# Patient Record
Sex: MALE | Race: WHITE | Employment: OTHER | URBAN - METROPOLITAN AREA
[De-identification: names, ages, dates, MRNs, and addresses within clinical notes are randomized per-mention and may not be internally consistent; named-entity substitution may affect disease eponyms.]

---

## 2023-07-08 ENCOUNTER — HOSPITAL ENCOUNTER (EMERGENCY)
Age: 68
Discharge: HOME | DRG: 310 | End: 2023-07-08
Attending: EMERGENCY MEDICINE
Payer: MEDICARE

## 2023-07-08 ENCOUNTER — APPOINTMENT (OUTPATIENT)
Dept: GENERAL RADIOLOGY | Age: 68
DRG: 310 | End: 2023-07-08
Payer: MEDICARE

## 2023-07-08 ENCOUNTER — HOSPITAL ENCOUNTER (INPATIENT)
Age: 68
LOS: 1 days | Discharge: HOME | DRG: 310 | End: 2023-07-10
Attending: EMERGENCY MEDICINE | Admitting: STUDENT IN AN ORGANIZED HEALTH CARE EDUCATION/TRAINING PROGRAM
Payer: MEDICARE

## 2023-07-08 VITALS
DIASTOLIC BLOOD PRESSURE: 82 MMHG | SYSTOLIC BLOOD PRESSURE: 181 MMHG | OXYGEN SATURATION: 99 % | RESPIRATION RATE: 15 BRPM | WEIGHT: 191 LBS | TEMPERATURE: 98.2 F | HEART RATE: 53 BPM

## 2023-07-08 DIAGNOSIS — I47.20 VENTRICULAR TACHYARRHYTHMIA (HCC): ICD-10-CM

## 2023-07-08 DIAGNOSIS — I48.0 INTERMITTENT ATRIAL FIBRILLATION (HCC): Primary | ICD-10-CM

## 2023-07-08 PROBLEM — I25.10 CAD (CORONARY ARTERY DISEASE): Chronic | Status: ACTIVE | Noted: 2023-07-08

## 2023-07-08 LAB
ALBUMIN SERPL-MCNC: 3.9 G/DL (ref 3.4–5)
ALBUMIN/GLOB SERPL: 1.1 {RATIO} (ref 1.1–2.2)
ALP SERPL-CCNC: 69 U/L (ref 40–129)
ALT SERPL-CCNC: 18 U/L (ref 10–40)
ANION GAP SERPL CALCULATED.3IONS-SCNC: 12 MMOL/L (ref 3–16)
ANION GAP SERPL CALCULATED.3IONS-SCNC: 9 MMOL/L (ref 3–16)
AST SERPL-CCNC: 19 U/L (ref 15–37)
BASOPHILS # BLD: 0 K/UL (ref 0–0.2)
BASOPHILS # BLD: 0 K/UL (ref 0–0.2)
BASOPHILS NFR BLD: 0.5 %
BASOPHILS NFR BLD: 0.6 %
BILIRUB SERPL-MCNC: 0.7 MG/DL (ref 0–1)
BUN SERPL-MCNC: 15 MG/DL (ref 7–20)
BUN SERPL-MCNC: 18 MG/DL (ref 7–20)
CALCIUM SERPL-MCNC: 9.4 MG/DL (ref 8.3–10.6)
CALCIUM SERPL-MCNC: 9.4 MG/DL (ref 8.3–10.6)
CHLORIDE SERPL-SCNC: 106 MMOL/L (ref 99–110)
CHLORIDE SERPL-SCNC: 106 MMOL/L (ref 99–110)
CO2 SERPL-SCNC: 23 MMOL/L (ref 21–32)
CO2 SERPL-SCNC: 25 MMOL/L (ref 21–32)
CREAT SERPL-MCNC: 0.9 MG/DL (ref 0.8–1.3)
CREAT SERPL-MCNC: 1 MG/DL (ref 0.8–1.3)
DEPRECATED RDW RBC AUTO: 15.5 % (ref 12.4–15.4)
DEPRECATED RDW RBC AUTO: 15.5 % (ref 12.4–15.4)
EOSINOPHIL # BLD: 0.2 K/UL (ref 0–0.6)
EOSINOPHIL # BLD: 0.2 K/UL (ref 0–0.6)
EOSINOPHIL NFR BLD: 2.2 %
EOSINOPHIL NFR BLD: 2.7 %
GFR SERPLBLD CREATININE-BSD FMLA CKD-EPI: >60 ML/MIN/{1.73_M2}
GFR SERPLBLD CREATININE-BSD FMLA CKD-EPI: >60 ML/MIN/{1.73_M2}
GLUCOSE SERPL-MCNC: 139 MG/DL (ref 70–99)
GLUCOSE SERPL-MCNC: 159 MG/DL (ref 70–99)
HCT VFR BLD AUTO: 40.7 % (ref 40.5–52.5)
HCT VFR BLD AUTO: 41.1 % (ref 40.5–52.5)
HGB BLD-MCNC: 13.7 G/DL (ref 13.5–17.5)
HGB BLD-MCNC: 13.8 G/DL (ref 13.5–17.5)
LYMPHOCYTES # BLD: 2.8 K/UL (ref 1–5.1)
LYMPHOCYTES # BLD: 3 K/UL (ref 1–5.1)
LYMPHOCYTES NFR BLD: 34 %
LYMPHOCYTES NFR BLD: 34.9 %
MAGNESIUM SERPL-MCNC: 1.8 MG/DL (ref 1.8–2.4)
MCH RBC QN AUTO: 28.3 PG (ref 26–34)
MCH RBC QN AUTO: 28.4 PG (ref 26–34)
MCHC RBC AUTO-ENTMCNC: 33.4 G/DL (ref 31–36)
MCHC RBC AUTO-ENTMCNC: 33.8 G/DL (ref 31–36)
MCV RBC AUTO: 83.8 FL (ref 80–100)
MCV RBC AUTO: 84.8 FL (ref 80–100)
MONOCYTES # BLD: 0.8 K/UL (ref 0–1.3)
MONOCYTES # BLD: 0.9 K/UL (ref 0–1.3)
MONOCYTES NFR BLD: 10.1 %
MONOCYTES NFR BLD: 10.3 %
NEUTROPHILS # BLD: 4.2 K/UL (ref 1.7–7.7)
NEUTROPHILS # BLD: 4.8 K/UL (ref 1.7–7.7)
NEUTROPHILS NFR BLD: 51.5 %
NEUTROPHILS NFR BLD: 53.2 %
PHOSPHATE SERPL-MCNC: 3.5 MG/DL (ref 2.5–4.9)
PLATELET # BLD AUTO: 247 K/UL (ref 135–450)
PLATELET # BLD AUTO: 259 K/UL (ref 135–450)
PMV BLD AUTO: 8.8 FL (ref 5–10.5)
PMV BLD AUTO: 9 FL (ref 5–10.5)
POTASSIUM SERPL-SCNC: 3.7 MMOL/L (ref 3.5–5.1)
POTASSIUM SERPL-SCNC: 4 MMOL/L (ref 3.5–5.1)
PROT SERPL-MCNC: 7.4 G/DL (ref 6.4–8.2)
RBC # BLD AUTO: 4.84 M/UL (ref 4.2–5.9)
RBC # BLD AUTO: 4.86 M/UL (ref 4.2–5.9)
SODIUM SERPL-SCNC: 140 MMOL/L (ref 136–145)
SODIUM SERPL-SCNC: 141 MMOL/L (ref 136–145)
TROPONIN, HIGH SENSITIVITY: 20 NG/L (ref 0–22)
TROPONIN, HIGH SENSITIVITY: 21 NG/L (ref 0–22)
WBC # BLD AUTO: 8.1 K/UL (ref 4–11)
WBC # BLD AUTO: 9 K/UL (ref 4–11)

## 2023-07-08 PROCEDURE — 84100 ASSAY OF PHOSPHORUS: CPT

## 2023-07-08 PROCEDURE — 93005 ELECTROCARDIOGRAM TRACING: CPT | Performed by: EMERGENCY MEDICINE

## 2023-07-08 PROCEDURE — 80048 BASIC METABOLIC PNL TOTAL CA: CPT

## 2023-07-08 PROCEDURE — 85025 COMPLETE CBC W/AUTO DIFF WBC: CPT

## 2023-07-08 PROCEDURE — 99284 EMERGENCY DEPT VISIT MOD MDM: CPT

## 2023-07-08 PROCEDURE — 96375 TX/PRO/DX INJ NEW DRUG ADDON: CPT

## 2023-07-08 PROCEDURE — G0378 HOSPITAL OBSERVATION PER HR: HCPCS

## 2023-07-08 PROCEDURE — 2580000003 HC RX 258: Performed by: STUDENT IN AN ORGANIZED HEALTH CARE EDUCATION/TRAINING PROGRAM

## 2023-07-08 PROCEDURE — 96374 THER/PROPH/DIAG INJ IV PUSH: CPT

## 2023-07-08 PROCEDURE — 2500000003 HC RX 250 WO HCPCS: Performed by: EMERGENCY MEDICINE

## 2023-07-08 PROCEDURE — 83735 ASSAY OF MAGNESIUM: CPT

## 2023-07-08 PROCEDURE — 6360000002 HC RX W HCPCS: Performed by: STUDENT IN AN ORGANIZED HEALTH CARE EDUCATION/TRAINING PROGRAM

## 2023-07-08 PROCEDURE — 71046 X-RAY EXAM CHEST 2 VIEWS: CPT

## 2023-07-08 PROCEDURE — 99285 EMERGENCY DEPT VISIT HI MDM: CPT

## 2023-07-08 PROCEDURE — 6370000000 HC RX 637 (ALT 250 FOR IP): Performed by: STUDENT IN AN ORGANIZED HEALTH CARE EDUCATION/TRAINING PROGRAM

## 2023-07-08 PROCEDURE — 36415 COLL VENOUS BLD VENIPUNCTURE: CPT

## 2023-07-08 PROCEDURE — 84484 ASSAY OF TROPONIN QUANT: CPT

## 2023-07-08 PROCEDURE — 96365 THER/PROPH/DIAG IV INF INIT: CPT

## 2023-07-08 PROCEDURE — 80053 COMPREHEN METABOLIC PANEL: CPT

## 2023-07-08 PROCEDURE — 6370000000 HC RX 637 (ALT 250 FOR IP): Performed by: EMERGENCY MEDICINE

## 2023-07-08 RX ORDER — SODIUM CHLORIDE 0.9 % (FLUSH) 0.9 %
5-40 SYRINGE (ML) INJECTION PRN
Status: DISCONTINUED | OUTPATIENT
Start: 2023-07-08 | End: 2023-07-10 | Stop reason: HOSPADM

## 2023-07-08 RX ORDER — ATORVASTATIN CALCIUM 40 MG/1
40 TABLET, FILM COATED ORAL DAILY
Status: DISCONTINUED | OUTPATIENT
Start: 2023-07-09 | End: 2023-07-10 | Stop reason: HOSPADM

## 2023-07-08 RX ORDER — AMIODARONE HYDROCHLORIDE 200 MG/1
200 TABLET ORAL DAILY
Status: ON HOLD | COMMUNITY
End: 2023-07-10 | Stop reason: HOSPADM

## 2023-07-08 RX ORDER — POLYETHYLENE GLYCOL 3350 17 G/17G
17 POWDER, FOR SOLUTION ORAL DAILY PRN
Status: DISCONTINUED | OUTPATIENT
Start: 2023-07-08 | End: 2023-07-10 | Stop reason: HOSPADM

## 2023-07-08 RX ORDER — POTASSIUM CHLORIDE 750 MG/1
20 TABLET, FILM COATED, EXTENDED RELEASE ORAL ONCE
Status: COMPLETED | OUTPATIENT
Start: 2023-07-08 | End: 2023-07-08

## 2023-07-08 RX ORDER — MAGNESIUM SULFATE IN WATER 40 MG/ML
2000 INJECTION, SOLUTION INTRAVENOUS ONCE
Status: COMPLETED | OUTPATIENT
Start: 2023-07-08 | End: 2023-07-09

## 2023-07-08 RX ORDER — MULTIVITAMIN
1 TABLET ORAL DAILY
COMMUNITY

## 2023-07-08 RX ORDER — LISINOPRIL 5 MG/1
5 TABLET ORAL DAILY
COMMUNITY

## 2023-07-08 RX ORDER — ASPIRIN 81 MG/1
81 TABLET, CHEWABLE ORAL DAILY
COMMUNITY

## 2023-07-08 RX ORDER — LISINOPRIL 5 MG/1
5 TABLET ORAL DAILY
Status: DISCONTINUED | OUTPATIENT
Start: 2023-07-09 | End: 2023-07-09

## 2023-07-08 RX ORDER — GUAIFENESIN 600 MG/1
600 TABLET, EXTENDED RELEASE ORAL 2 TIMES DAILY
COMMUNITY

## 2023-07-08 RX ORDER — METOPROLOL TARTRATE 5 MG/5ML
5 INJECTION INTRAVENOUS ONCE
Status: COMPLETED | OUTPATIENT
Start: 2023-07-08 | End: 2023-07-08

## 2023-07-08 RX ORDER — SODIUM CHLORIDE 9 MG/ML
INJECTION, SOLUTION INTRAVENOUS PRN
Status: DISCONTINUED | OUTPATIENT
Start: 2023-07-08 | End: 2023-07-10 | Stop reason: HOSPADM

## 2023-07-08 RX ORDER — ACETAMINOPHEN 500 MG
1000 TABLET ORAL ONCE
Status: COMPLETED | OUTPATIENT
Start: 2023-07-08 | End: 2023-07-08

## 2023-07-08 RX ORDER — FEXOFENADINE HCL 180 MG/1
180 TABLET ORAL DAILY
COMMUNITY

## 2023-07-08 RX ORDER — ONDANSETRON 2 MG/ML
4 INJECTION INTRAMUSCULAR; INTRAVENOUS EVERY 6 HOURS PRN
Status: DISCONTINUED | OUTPATIENT
Start: 2023-07-08 | End: 2023-07-10 | Stop reason: HOSPADM

## 2023-07-08 RX ORDER — ACETAMINOPHEN 650 MG/1
650 SUPPOSITORY RECTAL EVERY 6 HOURS PRN
Status: DISCONTINUED | OUTPATIENT
Start: 2023-07-08 | End: 2023-07-08

## 2023-07-08 RX ORDER — ONDANSETRON 4 MG/1
4 TABLET, ORALLY DISINTEGRATING ORAL EVERY 8 HOURS PRN
Status: DISCONTINUED | OUTPATIENT
Start: 2023-07-08 | End: 2023-07-10 | Stop reason: HOSPADM

## 2023-07-08 RX ORDER — SODIUM CHLORIDE 0.9 % (FLUSH) 0.9 %
5-40 SYRINGE (ML) INJECTION EVERY 12 HOURS SCHEDULED
Status: DISCONTINUED | OUTPATIENT
Start: 2023-07-08 | End: 2023-07-10 | Stop reason: HOSPADM

## 2023-07-08 RX ORDER — DOCUSATE SODIUM 100 MG/1
100 CAPSULE, LIQUID FILLED ORAL 2 TIMES DAILY
COMMUNITY

## 2023-07-08 RX ORDER — AMIODARONE HYDROCHLORIDE 200 MG/1
200 TABLET ORAL DAILY
Status: DISCONTINUED | OUTPATIENT
Start: 2023-07-09 | End: 2023-07-09

## 2023-07-08 RX ORDER — ASPIRIN 81 MG/1
81 TABLET, CHEWABLE ORAL DAILY
Status: DISCONTINUED | OUTPATIENT
Start: 2023-07-09 | End: 2023-07-10 | Stop reason: HOSPADM

## 2023-07-08 RX ORDER — ACETAMINOPHEN 325 MG/1
650 TABLET ORAL EVERY 6 HOURS PRN
Status: DISCONTINUED | OUTPATIENT
Start: 2023-07-08 | End: 2023-07-10 | Stop reason: HOSPADM

## 2023-07-08 RX ADMIN — MAGNESIUM SULFATE HEPTAHYDRATE 2000 MG: 40 INJECTION, SOLUTION INTRAVENOUS at 23:07

## 2023-07-08 RX ADMIN — ACETAMINOPHEN 650 MG: 325 TABLET ORAL at 22:59

## 2023-07-08 RX ADMIN — SODIUM CHLORIDE: 9 INJECTION, SOLUTION INTRAVENOUS at 23:06

## 2023-07-08 RX ADMIN — Medication 10 ML: at 23:04

## 2023-07-08 RX ADMIN — APIXABAN 5 MG: 5 TABLET, FILM COATED ORAL at 23:01

## 2023-07-08 RX ADMIN — POTASSIUM CHLORIDE 20 MEQ: 750 TABLET, FILM COATED, EXTENDED RELEASE ORAL at 22:59

## 2023-07-08 RX ADMIN — METOPROLOL TARTRATE 5 MG: 5 INJECTION INTRAVENOUS at 10:41

## 2023-07-08 RX ADMIN — ACETAMINOPHEN 1000 MG: 500 TABLET ORAL at 10:41

## 2023-07-08 ASSESSMENT — PAIN SCALES - GENERAL
PAINLEVEL_OUTOF10: 0
PAINLEVEL_OUTOF10: 7
PAINLEVEL_OUTOF10: 3
PAINLEVEL_OUTOF10: 1

## 2023-07-08 ASSESSMENT — PAIN DESCRIPTION - DESCRIPTORS: DESCRIPTORS: DISCOMFORT

## 2023-07-08 ASSESSMENT — ENCOUNTER SYMPTOMS
VOMITING: 0
SHORTNESS OF BREATH: 0
NAUSEA: 0
ABDOMINAL PAIN: 0
COUGH: 0

## 2023-07-08 ASSESSMENT — PAIN DESCRIPTION - LOCATION
LOCATION: CHEST
LOCATION: GENERALIZED

## 2023-07-08 ASSESSMENT — LIFESTYLE VARIABLES: HOW OFTEN DO YOU HAVE A DRINK CONTAINING ALCOHOL: NEVER

## 2023-07-08 ASSESSMENT — PAIN - FUNCTIONAL ASSESSMENT
PAIN_FUNCTIONAL_ASSESSMENT: NONE - DENIES PAIN
PAIN_FUNCTIONAL_ASSESSMENT: NONE - DENIES PAIN
PAIN_FUNCTIONAL_ASSESSMENT: ACTIVITIES ARE NOT PREVENTED

## 2023-07-08 ASSESSMENT — PAIN DESCRIPTION - ORIENTATION: ORIENTATION: MID

## 2023-07-08 NOTE — DISCHARGE INSTRUCTIONS
It was recommended for you to stay in the hospital and consider being evaluated for an implantable defibrillator device; you declined this and instead elected to return home. Continue to wear your Life Vest as often as possible. Continue your usual home medications, especially your blood thinner, amiodarone, and metoprolol! Be sure to contact your primary heart doctor to arrange for a follow up visit. I woulc recommend calling their offices today. If you have any new or worsening issues after going home don't hesitate to return here for reevaluation at any time 24/7!

## 2023-07-08 NOTE — ED NOTES
Pt arrived to [facility] via private vehicle. Pt c/o his external Zoll beeping to alert him of a shock. Pt states that he was not shocked because he stopped it. Pt denies any chest pain, SOB, or dizziness, but reports a recent heart surgery/triple bypass and that he wears the external Zoll because he would not allow his heart surgeons to place an internal defibrillator because he didn't want to be shocked. Pt in afib upon arrival, states that he has been told in the past that he has been in afib. Pt states that he is in town for a family reunion, his cardiology team is in [place]. Pt awake, alert and oriented x 3. Skin warm and dry/normal color for ethnicity. Resp easy and unlabored. Pt placed in gown and on cardiac monitor. Call light in reach. Will continue to monitor.        Gabriela Blake RN  07/08/23 1841       Diamond Norton RN  07/08/23 2249

## 2023-07-09 PROBLEM — I47.29 NSVT (NONSUSTAINED VENTRICULAR TACHYCARDIA) (HCC): Status: ACTIVE | Noted: 2023-07-09

## 2023-07-09 PROBLEM — I10 PRIMARY HYPERTENSION: Status: ACTIVE | Noted: 2023-07-09

## 2023-07-09 PROBLEM — I25.5 ISCHEMIC CARDIOMYOPATHY: Status: ACTIVE | Noted: 2023-07-09

## 2023-07-09 PROBLEM — I48.91 ATRIAL FIBRILLATION WITH RAPID VENTRICULAR RESPONSE (HCC): Status: ACTIVE | Noted: 2023-07-09

## 2023-07-09 LAB
ANION GAP SERPL CALCULATED.3IONS-SCNC: 9 MMOL/L (ref 3–16)
BUN SERPL-MCNC: 17 MG/DL (ref 7–20)
CALCIUM SERPL-MCNC: 8.8 MG/DL (ref 8.3–10.6)
CHLORIDE SERPL-SCNC: 105 MMOL/L (ref 99–110)
CO2 SERPL-SCNC: 26 MMOL/L (ref 21–32)
CREAT SERPL-MCNC: 1 MG/DL (ref 0.8–1.3)
DEPRECATED RDW RBC AUTO: 15.9 % (ref 12.4–15.4)
EKG ATRIAL RATE: 144 BPM
EKG ATRIAL RATE: 52 BPM
EKG DIAGNOSIS: NORMAL
EKG P AXIS: 59 DEGREES
EKG P AXIS: 86 DEGREES
EKG P-R INTERVAL: 156 MS
EKG Q-T INTERVAL: 344 MS
EKG Q-T INTERVAL: 358 MS
EKG Q-T INTERVAL: 480 MS
EKG QRS DURATION: 140 MS
EKG QRS DURATION: 142 MS
EKG QRS DURATION: 96 MS
EKG QTC CALCULATION (BAZETT): 446 MS
EKG QTC CALCULATION (BAZETT): 540 MS
EKG QTC CALCULATION (BAZETT): 554 MS
EKG R AXIS: -66 DEGREES
EKG R AXIS: 149 DEGREES
EKG R AXIS: 90 DEGREES
EKG T AXIS: -11 DEGREES
EKG T AXIS: 113 DEGREES
EKG T AXIS: 71 DEGREES
EKG VENTRICULAR RATE: 144 BPM
EKG VENTRICULAR RATE: 148 BPM
EKG VENTRICULAR RATE: 52 BPM
GFR SERPLBLD CREATININE-BSD FMLA CKD-EPI: >60 ML/MIN/{1.73_M2}
GLUCOSE SERPL-MCNC: 120 MG/DL (ref 70–99)
HCT VFR BLD AUTO: 40.4 % (ref 40.5–52.5)
HGB BLD-MCNC: 13.4 G/DL (ref 13.5–17.5)
MAGNESIUM SERPL-MCNC: 2.6 MG/DL (ref 1.8–2.4)
MCH RBC QN AUTO: 28 PG (ref 26–34)
MCHC RBC AUTO-ENTMCNC: 33.2 G/DL (ref 31–36)
MCV RBC AUTO: 84.3 FL (ref 80–100)
PHOSPHATE SERPL-MCNC: 4.5 MG/DL (ref 2.5–4.9)
PLATELET # BLD AUTO: 238 K/UL (ref 135–450)
PMV BLD AUTO: 8.8 FL (ref 5–10.5)
POTASSIUM SERPL-SCNC: 4.3 MMOL/L (ref 3.5–5.1)
RBC # BLD AUTO: 4.8 M/UL (ref 4.2–5.9)
SODIUM SERPL-SCNC: 140 MMOL/L (ref 136–145)
TROPONIN, HIGH SENSITIVITY: 20 NG/L (ref 0–22)
WBC # BLD AUTO: 7.5 K/UL (ref 4–11)

## 2023-07-09 PROCEDURE — 2580000003 HC RX 258: Performed by: INTERNAL MEDICINE

## 2023-07-09 PROCEDURE — G0378 HOSPITAL OBSERVATION PER HR: HCPCS

## 2023-07-09 PROCEDURE — 84100 ASSAY OF PHOSPHORUS: CPT

## 2023-07-09 PROCEDURE — 93010 ELECTROCARDIOGRAM REPORT: CPT | Performed by: INTERNAL MEDICINE

## 2023-07-09 PROCEDURE — 83735 ASSAY OF MAGNESIUM: CPT

## 2023-07-09 PROCEDURE — 2580000003 HC RX 258: Performed by: STUDENT IN AN ORGANIZED HEALTH CARE EDUCATION/TRAINING PROGRAM

## 2023-07-09 PROCEDURE — 6360000002 HC RX W HCPCS: Performed by: INTERNAL MEDICINE

## 2023-07-09 PROCEDURE — 96367 TX/PROPH/DG ADDL SEQ IV INF: CPT

## 2023-07-09 PROCEDURE — 6370000000 HC RX 637 (ALT 250 FOR IP): Performed by: INTERNAL MEDICINE

## 2023-07-09 PROCEDURE — 6370000000 HC RX 637 (ALT 250 FOR IP): Performed by: STUDENT IN AN ORGANIZED HEALTH CARE EDUCATION/TRAINING PROGRAM

## 2023-07-09 PROCEDURE — 99223 1ST HOSP IP/OBS HIGH 75: CPT | Performed by: INTERNAL MEDICINE

## 2023-07-09 PROCEDURE — 1200000000 HC SEMI PRIVATE

## 2023-07-09 PROCEDURE — 96366 THER/PROPH/DIAG IV INF ADDON: CPT

## 2023-07-09 PROCEDURE — 80048 BASIC METABOLIC PNL TOTAL CA: CPT

## 2023-07-09 PROCEDURE — 36415 COLL VENOUS BLD VENIPUNCTURE: CPT

## 2023-07-09 PROCEDURE — 85027 COMPLETE CBC AUTOMATED: CPT

## 2023-07-09 PROCEDURE — 94760 N-INVAS EAR/PLS OXIMETRY 1: CPT

## 2023-07-09 PROCEDURE — 84484 ASSAY OF TROPONIN QUANT: CPT

## 2023-07-09 RX ORDER — AMIODARONE HYDROCHLORIDE 200 MG/1
200 TABLET ORAL 2 TIMES DAILY
Status: DISCONTINUED | OUTPATIENT
Start: 2023-07-10 | End: 2023-07-10 | Stop reason: HOSPADM

## 2023-07-09 RX ORDER — AMIODARONE HYDROCHLORIDE 200 MG/1
200 TABLET ORAL DAILY
Status: DISCONTINUED | OUTPATIENT
Start: 2023-07-17 | End: 2023-07-10 | Stop reason: HOSPADM

## 2023-07-09 RX ORDER — METOPROLOL SUCCINATE 25 MG/1
25 TABLET, EXTENDED RELEASE ORAL DAILY
Status: DISCONTINUED | OUTPATIENT
Start: 2023-07-09 | End: 2023-07-10 | Stop reason: HOSPADM

## 2023-07-09 RX ORDER — LISINOPRIL 10 MG/1
10 TABLET ORAL DAILY
Status: DISCONTINUED | OUTPATIENT
Start: 2023-07-10 | End: 2023-07-10 | Stop reason: HOSPADM

## 2023-07-09 RX ORDER — METOPROLOL SUCCINATE 50 MG/1
50 TABLET, EXTENDED RELEASE ORAL DAILY
Status: DISCONTINUED | OUTPATIENT
Start: 2023-07-09 | End: 2023-07-09

## 2023-07-09 RX ADMIN — METOPROLOL TARTRATE 25 MG: 25 TABLET, FILM COATED ORAL at 08:22

## 2023-07-09 RX ADMIN — ASPIRIN 81 MG 81 MG: 81 TABLET ORAL at 08:23

## 2023-07-09 RX ADMIN — Medication 10 ML: at 22:01

## 2023-07-09 RX ADMIN — LISINOPRIL 5 MG: 5 TABLET ORAL at 08:23

## 2023-07-09 RX ADMIN — APIXABAN 5 MG: 5 TABLET, FILM COATED ORAL at 08:22

## 2023-07-09 RX ADMIN — ATORVASTATIN CALCIUM 40 MG: 40 TABLET, FILM COATED ORAL at 08:22

## 2023-07-09 RX ADMIN — Medication 10 ML: at 08:27

## 2023-07-09 RX ADMIN — ACETAMINOPHEN 650 MG: 325 TABLET ORAL at 22:01

## 2023-07-09 RX ADMIN — APIXABAN 5 MG: 5 TABLET, FILM COATED ORAL at 22:01

## 2023-07-09 RX ADMIN — METOPROLOL SUCCINATE 25 MG: 25 TABLET, EXTENDED RELEASE ORAL at 13:56

## 2023-07-09 RX ADMIN — ACETAMINOPHEN 650 MG: 325 TABLET ORAL at 13:52

## 2023-07-09 RX ADMIN — AMIODARONE HYDROCHLORIDE 200 MG: 200 TABLET ORAL at 08:22

## 2023-07-09 RX ADMIN — AMIODARONE HYDROCHLORIDE 1 MG/MIN: 50 INJECTION, SOLUTION INTRAVENOUS at 10:04

## 2023-07-09 ASSESSMENT — PAIN SCALES - WONG BAKER: WONGBAKER_NUMERICALRESPONSE: 0

## 2023-07-09 ASSESSMENT — PAIN SCALES - GENERAL
PAINLEVEL_OUTOF10: 0
PAINLEVEL_OUTOF10: 0
PAINLEVEL_OUTOF10: 5

## 2023-07-09 ASSESSMENT — PAIN DESCRIPTION - LOCATION: LOCATION: HAND

## 2023-07-10 VITALS
SYSTOLIC BLOOD PRESSURE: 167 MMHG | TEMPERATURE: 97.5 F | HEIGHT: 67 IN | DIASTOLIC BLOOD PRESSURE: 75 MMHG | OXYGEN SATURATION: 99 % | BODY MASS INDEX: 29.65 KG/M2 | HEART RATE: 63 BPM | RESPIRATION RATE: 18 BRPM | WEIGHT: 188.93 LBS

## 2023-07-10 PROCEDURE — 2580000003 HC RX 258: Performed by: STUDENT IN AN ORGANIZED HEALTH CARE EDUCATION/TRAINING PROGRAM

## 2023-07-10 PROCEDURE — 99232 SBSQ HOSP IP/OBS MODERATE 35: CPT | Performed by: NURSE PRACTITIONER

## 2023-07-10 PROCEDURE — 6370000000 HC RX 637 (ALT 250 FOR IP): Performed by: INTERNAL MEDICINE

## 2023-07-10 PROCEDURE — 6370000000 HC RX 637 (ALT 250 FOR IP): Performed by: STUDENT IN AN ORGANIZED HEALTH CARE EDUCATION/TRAINING PROGRAM

## 2023-07-10 RX ORDER — AMIODARONE HYDROCHLORIDE 200 MG/1
200 TABLET ORAL DAILY
Qty: 30 TABLET | Refills: 0 | Status: SHIPPED | OUTPATIENT
Start: 2023-07-17 | End: 2023-08-16

## 2023-07-10 RX ORDER — METOPROLOL SUCCINATE 25 MG/1
25 TABLET, EXTENDED RELEASE ORAL DAILY
Qty: 30 TABLET | Refills: 3 | Status: SHIPPED | OUTPATIENT
Start: 2023-07-11

## 2023-07-10 RX ORDER — AMIODARONE HYDROCHLORIDE 200 MG/1
200 TABLET ORAL 2 TIMES DAILY
Qty: 13 TABLET | Refills: 0 | Status: SHIPPED | OUTPATIENT
Start: 2023-07-10 | End: 2023-07-17

## 2023-07-10 RX ADMIN — AMIODARONE HYDROCHLORIDE 200 MG: 200 TABLET ORAL at 08:14

## 2023-07-10 RX ADMIN — ACETAMINOPHEN 650 MG: 325 TABLET ORAL at 08:25

## 2023-07-10 RX ADMIN — ASPIRIN 81 MG 81 MG: 81 TABLET ORAL at 08:14

## 2023-07-10 RX ADMIN — APIXABAN 5 MG: 5 TABLET, FILM COATED ORAL at 08:14

## 2023-07-10 RX ADMIN — Medication 10 ML: at 08:15

## 2023-07-10 RX ADMIN — LISINOPRIL 10 MG: 10 TABLET ORAL at 08:14

## 2023-07-10 RX ADMIN — METOPROLOL SUCCINATE 25 MG: 25 TABLET, EXTENDED RELEASE ORAL at 08:14

## 2023-07-10 ASSESSMENT — PAIN DESCRIPTION - DESCRIPTORS: DESCRIPTORS: DISCOMFORT

## 2023-07-10 ASSESSMENT — PAIN DESCRIPTION - ORIENTATION: ORIENTATION: MID

## 2023-07-10 ASSESSMENT — PAIN SCALES - GENERAL
PAINLEVEL_OUTOF10: 1
PAINLEVEL_OUTOF10: 3

## 2023-07-10 ASSESSMENT — PAIN DESCRIPTION - LOCATION: LOCATION: CHEST

## 2023-07-10 ASSESSMENT — PAIN - FUNCTIONAL ASSESSMENT: PAIN_FUNCTIONAL_ASSESSMENT: ACTIVITIES ARE NOT PREVENTED

## 2023-07-10 NOTE — DISCHARGE SUMMARY
Hospital Medicine Discharge Summary    Patient ID: Anen Méndez      Patient's PCP: Sarita Patel MD    Admit Date: 7/8/2023     Discharge Date:   07/10/23      Admitting Provider: Humphrey Jj MD     Discharge Provider: Humphrey Jj MD     Discharge Diagnoses: Active Hospital Problems    Diagnosis     NSVT (nonsustained ventricular tachycardia) (HCC) [I47.29]     Primary hypertension [I10]     Ischemic cardiomyopathy [I25.5]     Atrial fibrillation with rapid ventricular response (HCC) [I48.91]     Intermittent atrial fibrillation (HCC) [I48.0]     CAD (coronary artery disease) [I25.10]        The patient was seen and examined on day of discharge and this discharge summary is in conjunction with any daily progress note from day of discharge. Hospital Course:   80 y.o. male who presents with with history of CAD s/p CABG 4/2023, a fib on eliquis, HLD who presents after his Life Vest notified him that he was going to be shocked multiple times. Pt recently underwent CABG 3 months ago. He says he has been in good health up until 11/2022 when he started developing symptoms of unstable angina. He eventually underwent LHC which showed 3 vessel disease and is now s/p CABG 4/2023. Since his surgery he has been followed by cardiology for management of tachyarrhythmias, notably a fib and VT shortly after his surgery. He has been doing well on amiodarone and metoprolol however was given the burden of his arrhythmias was recommended to wear life vest while he is visiting [place] for a family reunion (resides in [place]). EKG showed a fib with RVR HR in the 140s. A fib with RVR  Review of lifevest rhythm strips show a fib with RVR as well as ?ventricular tachycardia vs atrial fibrillation with aberrancy.  Resolved on initial ED visit this morning with one dose of IV metoprolol, but reoccurred after he left the hospital.      Patient reverted again to AF with RVR that was abated with IV

## 2023-07-19 NOTE — PROGRESS NOTES
Discharge orders acknowledged by RN . Discharge teaching completed with pt and family. AVS reviewed and all questions answered. Medication regimen reviewed and pt understands schedule. Follow up appointments also reviewed with pt and resources given for discharge. Pt was sent electronic to be filled and understands schedule. IV removed. Bedside monitor removed from pt. 60+ minutes of education completed. Required core measures completed. Pt vitals WDL. Pt discharged with all belongings to home. Pt ambulated off of unit with RN. No complications.          Electronically signed by Liz Masters RN on 7/10/2023 at 11:17 AM
Physician Progress Note      PATIENTEloy Schwab  CSN #:                  [id number]  :                       1955  ADMIT DATE:       2023 6:24 PM  [address] [place] [address] DATE:        7/10/2023 11:21 AM  RESPONDING  PROVIDER #:        Ruel Varner MD          QUERY TEXT:    Patient admitted with afib maintained on Eliquis If possible, please document   in progress notes and discharge summary if you are evaluating and/or treating   any of the following: The medical record reflects the following:  Risk Factors: hx of afib  Clinical Indicators: hx of HTN  Treatment: Eliquis PO  Options provided:  -- Secondary hypercoagulable state in a patient with atrial fibrillation  -- Other - I will add my own diagnosis  -- Disagree - Not applicable / Not valid  -- Disagree - Clinically unable to determine / Unknown  -- Refer to Clinical Documentation Reviewer    PROVIDER RESPONSE TEXT:    Provider disagreed with this query.     Query created by: Agustin Jaimes on 2023 1:39 PM      Electronically signed by:  Ruel Varner MD 2023 9:09 AM
No adenopathy. LUNG: AP diameter normal. Clear bilateral. No wheeze, rales, or ronchi. Respiratory effort normal.  HEART: S1S2 A/R. No JVD. No carotid bruit. No murmur, rub or gallop. ABD: Soft, nontender. +BS X 4 quads. No hepatomegaly. EXT: Radial and pedal pulses 2+ and symmetric. Without varicosities. No edema. MUSCSKEL: Good ROM X4 extremities. No deformity. NEURO: A/O X3. Calm and cooperative. Telemetry: AF    Medications:    lisinopril  10 mg Oral Daily    amiodarone  200 mg Oral BID    [START ON 7/17/2023] amiodarone  200 mg Oral Daily    metoprolol succinate  25 mg Oral Daily    sodium chloride flush  5-40 mL IntraVENous 2 times per day    apixaban  5 mg Oral BID    aspirin  81 mg Oral Daily    atorvastatin  40 mg Oral Daily      sodium chloride 25 mL/hr at 07/08/23 2306     sodium chloride flush, sodium chloride, ondansetron **OR** ondansetron, polyethylene glycol, acetaminophen **OR** [DISCONTINUED] acetaminophen    Lab Data: I have reviewed all labs below today.    CBC:   Recent Labs     07/08/23  1003 07/08/23 1922 07/09/23  0419   WBC 8.1 9.0 7.5   HGB 13.7 13.8 13.4*   HCT 41.1 40.7 40.4*   MCV 84.8 83.8 84.3    259 238     BMP:   Recent Labs     07/08/23  1003 07/08/23 1922 07/09/23  0419    141 140   K 4.0 3.7 4.3    106 105   CO2 25 23 26   PHOS 3.5  --  4.5   BUN 15 18 17   CREATININE 0.9 1.0 1.0     GLUCOSE:   Recent Labs     07/08/23  1003 07/08/23 1922 07/09/23  0419   GLUCOSE 139* 159* 120*     LIVER PROFILE:   Lab Results   Component Value Date/Time    AST 19 07/08/2023 07:22 PM    ALT 18 07/08/2023 07:22 PM    LABALBU 3.9 07/08/2023 07:22 PM    BILITOT 0.7 07/08/2023 07:22 PM    ALKPHOS 69 07/08/2023 07:22 PM     PT/INR: No results found for: PROTIME, INR  APTT: No results found for: APTT  Pro-BNP:  No results found for: PROBNP  Parameters:   > 450 pg/mL under age 48  > 900 pg/mL ages 52-65  > 1800 pg/mL over age 76    ENZYMES:No results found for: CKTOTAL,